# Patient Record
Sex: MALE | Race: WHITE | URBAN - METROPOLITAN AREA
[De-identification: names, ages, dates, MRNs, and addresses within clinical notes are randomized per-mention and may not be internally consistent; named-entity substitution may affect disease eponyms.]

---

## 2017-04-05 ENCOUNTER — HOSPITAL ENCOUNTER (EMERGENCY)
Facility: CLINIC | Age: 32
Discharge: HOME | End: 2017-04-05
Attending: EMERGENCY MEDICINE | Admitting: EMERGENCY MEDICINE
Payer: COMMERCIAL

## 2017-04-05 ENCOUNTER — APPOINTMENT (OUTPATIENT)
Dept: GENERAL RADIOLOGY | Facility: CLINIC | Age: 32
End: 2017-04-05
Attending: EMERGENCY MEDICINE
Payer: COMMERCIAL

## 2017-04-05 VITALS
WEIGHT: 170 LBS | HEART RATE: 40 BPM | DIASTOLIC BLOOD PRESSURE: 77 MMHG | TEMPERATURE: 97.7 F | BODY MASS INDEX: 23.71 KG/M2 | RESPIRATION RATE: 20 BRPM | OXYGEN SATURATION: 99 % | SYSTOLIC BLOOD PRESSURE: 125 MMHG

## 2017-04-05 DIAGNOSIS — R07.89 ATYPICAL CHEST PAIN: ICD-10-CM

## 2017-04-05 DIAGNOSIS — I49.3 FREQUENT PVCS: ICD-10-CM

## 2017-04-05 LAB
ANION GAP SERPL CALCULATED.3IONS-SCNC: 7 MMOL/L (ref 3–14)
BASOPHILS # BLD AUTO: 0 10E9/L (ref 0–0.2)
BASOPHILS NFR BLD AUTO: 0.4 %
BUN SERPL-MCNC: 12 MG/DL (ref 7–30)
CALCIUM SERPL-MCNC: 8.7 MG/DL (ref 8.5–10.1)
CHLORIDE SERPL-SCNC: 104 MMOL/L (ref 94–109)
CO2 SERPL-SCNC: 27 MMOL/L (ref 20–32)
CREAT SERPL-MCNC: 0.85 MG/DL (ref 0.66–1.25)
DIFFERENTIAL METHOD BLD: NORMAL
EOSINOPHIL # BLD AUTO: 0.3 10E9/L (ref 0–0.7)
EOSINOPHIL NFR BLD AUTO: 4.4 %
ERYTHROCYTE [DISTWIDTH] IN BLOOD BY AUTOMATED COUNT: 12.6 % (ref 10–15)
GFR SERPL CREATININE-BSD FRML MDRD: NORMAL ML/MIN/1.7M2
GLUCOSE SERPL-MCNC: 89 MG/DL (ref 70–99)
HCT VFR BLD AUTO: 42.8 % (ref 40–53)
HGB BLD-MCNC: 14.1 G/DL (ref 13.3–17.7)
IMM GRANULOCYTES # BLD: 0 10E9/L (ref 0–0.4)
IMM GRANULOCYTES NFR BLD: 0.3 %
LYMPHOCYTES # BLD AUTO: 2.1 10E9/L (ref 0.8–5.3)
LYMPHOCYTES NFR BLD AUTO: 27.4 %
MAGNESIUM SERPL-MCNC: 2.1 MG/DL (ref 1.6–2.3)
MCH RBC QN AUTO: 27.9 PG (ref 26.5–33)
MCHC RBC AUTO-ENTMCNC: 32.9 G/DL (ref 31.5–36.5)
MCV RBC AUTO: 85 FL (ref 78–100)
MONOCYTES # BLD AUTO: 0.7 10E9/L (ref 0–1.3)
MONOCYTES NFR BLD AUTO: 9 %
NEUTROPHILS # BLD AUTO: 4.5 10E9/L (ref 1.6–8.3)
NEUTROPHILS NFR BLD AUTO: 58.5 %
NRBC # BLD AUTO: 0 10*3/UL
NRBC BLD AUTO-RTO: 0 /100
PLATELET # BLD AUTO: 250 10E9/L (ref 150–450)
POTASSIUM SERPL-SCNC: 3.5 MMOL/L (ref 3.4–5.3)
RBC # BLD AUTO: 5.05 10E12/L (ref 4.4–5.9)
SODIUM SERPL-SCNC: 138 MMOL/L (ref 133–144)
TSH SERPL DL<=0.05 MIU/L-ACNC: 1.5 MU/L (ref 0.4–4)
WBC # BLD AUTO: 7.8 10E9/L (ref 4–11)

## 2017-04-05 PROCEDURE — 80048 BASIC METABOLIC PNL TOTAL CA: CPT | Performed by: EMERGENCY MEDICINE

## 2017-04-05 PROCEDURE — 71020 XR CHEST 2 VW: CPT

## 2017-04-05 PROCEDURE — 93005 ELECTROCARDIOGRAM TRACING: CPT

## 2017-04-05 PROCEDURE — 83735 ASSAY OF MAGNESIUM: CPT | Performed by: EMERGENCY MEDICINE

## 2017-04-05 PROCEDURE — 84443 ASSAY THYROID STIM HORMONE: CPT | Performed by: EMERGENCY MEDICINE

## 2017-04-05 PROCEDURE — 99285 EMERGENCY DEPT VISIT HI MDM: CPT | Mod: 25

## 2017-04-05 PROCEDURE — 85025 COMPLETE CBC W/AUTO DIFF WBC: CPT | Performed by: EMERGENCY MEDICINE

## 2017-04-05 PROCEDURE — 96361 HYDRATE IV INFUSION ADD-ON: CPT

## 2017-04-05 PROCEDURE — 25000128 H RX IP 250 OP 636: Performed by: EMERGENCY MEDICINE

## 2017-04-05 PROCEDURE — 93005 ELECTROCARDIOGRAM TRACING: CPT | Mod: 76

## 2017-04-05 PROCEDURE — 96360 HYDRATION IV INFUSION INIT: CPT

## 2017-04-05 RX ORDER — LIDOCAINE 40 MG/G
CREAM TOPICAL
Status: DISCONTINUED | OUTPATIENT
Start: 2017-04-05 | End: 2017-04-05 | Stop reason: HOSPADM

## 2017-04-05 RX ORDER — SODIUM CHLORIDE 9 MG/ML
1000 INJECTION, SOLUTION INTRAVENOUS CONTINUOUS
Status: DISCONTINUED | OUTPATIENT
Start: 2017-04-05 | End: 2017-04-05 | Stop reason: HOSPADM

## 2017-04-05 RX ADMIN — SODIUM CHLORIDE 1000 ML: 9 INJECTION, SOLUTION INTRAVENOUS at 15:36

## 2017-04-05 ASSESSMENT — ENCOUNTER SYMPTOMS
SHORTNESS OF BREATH: 0
NAUSEA: 0
ABDOMINAL PAIN: 0
DIAPHORESIS: 0
PALPITATIONS: 0

## 2017-04-05 NOTE — DISCHARGE INSTRUCTIONS
*CHEST PAIN, UNCERTAIN CAUSE    Based on your exam today, the exact cause of your chest pain is not certain. Your condition does not seem serious at this time, and your pain does not appear to be coming from your heart. However, sometimes the signs of a serious problem take more time to appear. Therefore, watch for the warning signs listed below.  HOME CARE:  1. Rest today and avoid strenuous activity.  2. Take any prescribed medicine as directed.  FOLLOW UP with your doctor in 1-3 days.   GET PROMPT MEDICAL ATTENTION if any of the following occur:    A change in the type of pain: if it feels different, becomes more severe, lasts longer, or begins to spread into your shoulder, arm, neck, jaw or back    Shortness of breath or increased pain with breathing    Weakness, dizziness, or fainting    Cough with blood or dark colored sputum (phlegm)    Fever over 101  F (38.3  C)    Swelling, pain or redness in one leg    2526-3738 [place] [address]. All rights reserved. This information is not intended as a substitute for professional medical care. Always follow your healthcare professional's instructions.      Premature Ventricular Contractions  Premature ventricular contractions (PVCs) are a type of arrhythmia (irregular heartbeat). They are common and can affect people of all ages. PVCs are almost never dangerous. But if other heart problems are present, PVCs can cause serious health issues. This sheet tells you more about PVCs and how they are treated.  Understanding Your Heart s Electrical System     During a normal heartbeat, electrical signals start at the SA node and are sent through the walls of the heart s chambers.   To understand how PVCs occur, it helps to first understand how your heart works. Your heart is a muscle that pumps blood throughout the body. It is made up of 4 chambers: 2 atria and 2 ventricles. Electrical signals are sent to these chambers, making them  contract (squeeze) in a certain order. This rhythm, which pumps blood through and out of your heart, is your heartbeat. The process begins in the sinoatrial (SA) or sinus node. This is the heart's natural pacemaker:    The SA node. This group of cells in the right atrium sends a signal to both atria, telling them to contract. When the atria contract, blood is pumped into the ventricles.     The AV node. This is another group of cells in the right atrium. It receives the signal from the SA node after it passes through the atria. The AV node transmits the electrical signal from the atria to the ventricles.   What Happens During a PVC?  During a PVC, an abnormal signal disrupts the normal heartbeat. This signal comes from the ventricle instead of the SA node. The signal causes the ventricles to contract too soon, and the heart skips the next normal beat. This results in an irregular heartbeat.  What Are the Symptoms of PVCs?  Sometimes PVCs cause no symptoms at all. Other times, a patient may feel palpitations (irregular heartbeats). These can feel like  skipped  beats, or  flopping  in the chest. If PVCs are frequent, other symptoms can occur. These include tiredness, feeling faint, or shortness of breath. They also include fullness or pressure in the neck, and chest pain. These symptoms occur because less oxygen is delivered to the body. This is because PVCs make the heart pump blood less effectively.  What Causes PVCs?  In some cases, no cause of PVCs is found. When a cause is found, it is either chemical or structural:    Chemical. Changes in the body s chemistry can prompt PVCs. For instance, raised levels of certain hormones, such as adrenaline or thyroid, can cause PVCs. Consuming substances such as alcohol and caffeine can also cause them.    Structural. This involves existing problems in the heart and/or cardiovascular system. Coronary artery disease (CAD) is 1 type of problem that can be related to PVCs.  Others are heart failure and heart valve problems.   How Are PVCs Diagnosed?  The doctor will take your medical history and ask you to describe your symptoms. You ll also have a physical exam. And certain tests may be done. These include:    Electrocardiogram (ECG or EKG). This test records the electrical activity of your heart. During an ECG, small pads (electrodes) are placed on your chest, arms, and legs. Wires connect the pads to a machine, which records your heart s electrical signals.    Heart monitor. There are 2 types of external heart monitors:    Holter monitor. This monitor can be worn for 1 to 7 days. It provides a constant recording of heart activity. After the test is done, your health care provider analyzes the recording.    Event monitors. These monitors can be used for 3 to 4 weeks. One kind is a memory loop recorder. This monitor records constantly, but stores the recording only when you press a button. The other kind is a credit card-sized recorder. This monitor is turned on only during an episode. With both types, you send the recordings of symptoms to your health care provider over the phone.  How Are PVCs Treated?  Treatment depends on whether structural heart problems are present. It is also determined by the severity of symptoms:    If you have no other heart problems and your symptoms are not bothersome, treatment may not be needed. If it is needed, treatment can involve:    Lifestyle changes. Your doctor may suggest that you exercise and limit caffeine and alcohol. If you smoke, you ll be advised to quit.    Medications. Two types of medication can help with PVCs. Beta-blockers and calcium channel blockers both can lower the heart rate and reduce blood pressure.    If you have structural heart problems, you ll likely be referred to a doctor who specializes in heart rhythm problems. This may be a cardiologist or an electrophysiologist. An electrophysiologist is a cardiologist who specializes  in the electrical system of the heart. You will need a referral because for you, PVCs can be a bigger threat to your health. Depending on the nature of your heart disease, you may need treatment for an underlying heart problem. In severe cases, an ICD (implantable cardioverter defibrillator) may be implanted. This is done to treat the underlying heart disease. It can help normalize the heart rhythm.    1229-0438 [employer]. [address] [place] [address], [place] [address]. All rights reserved. This information is not intended as a substitute for professional medical care. Always follow your healthcare professional's instructions.

## 2017-04-05 NOTE — ED AVS SNAPSHOT
[facility] Department    [address]    [facility] [address]    Phone:  [phone]    Fax:  [phone]                                       Yan Edwards   MRN: [id number]    Department:  [facility] Emergency Department   Date of Visit:  4/5/2017           Patient Information     Date Of Birth          1985        Your diagnoses for this visit were:     Atypical chest pain     Frequent PVCs        You were seen by Sophia Ramires MD.      Follow-up Information     Follow up with Primary clinic.    Why:  within 3 days for reassessment        Follow up with Cardiologist/electrophysiologist.    Why:  1-2 weeks        Follow up with [facility] Emergency Department.    Specialty:  EMERGENCY MEDICINE    Why:  As needed, If symptoms worsen    Contact information:    [address]  [facility] [address]  [phone]        Discharge Instructions          *CHEST PAIN, UNCERTAIN CAUSE    Based on your exam today, the exact cause of your chest pain is not certain. Your condition does not seem serious at this time, and your pain does not appear to be coming from your heart. However, sometimes the signs of a serious problem take more time to appear. Therefore, watch for the warning signs listed below.  HOME CARE:  1. Rest today and avoid strenuous activity.  2. Take any prescribed medicine as directed.  FOLLOW UP with your doctor in 1-3 days.   GET PROMPT MEDICAL ATTENTION if any of the following occur:    A change in the type of pain: if it feels different, becomes more severe, lasts longer, or begins to spread into your shoulder, arm, neck, jaw or back    Shortness of breath or increased pain with breathing    Weakness, dizziness, or fainting    Cough with blood or dark colored sputum (phlegm)    Fever over 101  F (38.3  C)    Swelling, pain or redness in one leg    9679-8172 [address]  [place] [address]. All rights reserved. This information is not intended as a substitute for professional medical care. Always follow your healthcare professional's instructions.      Premature Ventricular Contractions  Premature ventricular contractions (PVCs) are a type of arrhythmia (irregular heartbeat). They are common and can affect people of all ages. PVCs are almost never dangerous. But if other heart problems are present, PVCs can cause serious health issues. This sheet tells you more about PVCs and how they are treated.  Understanding Your Heart s Electrical System     During a normal heartbeat, electrical signals start at the SA node and are sent through the walls of the heart s chambers.   To understand how PVCs occur, it helps to first understand how your heart works. Your heart is a muscle that pumps blood throughout the body. It is made up of 4 chambers: 2 atria and 2 ventricles. Electrical signals are sent to these chambers, making them contract (squeeze) in a certain order. This rhythm, which pumps blood through and out of your heart, is your heartbeat. The process begins in the sinoatrial (SA) or sinus node. This is the heart's natural pacemaker:    The SA node. This group of cells in the right atrium sends a signal to both atria, telling them to contract. When the atria contract, blood is pumped into the ventricles.     The AV node. This is another group of cells in the right atrium. It receives the signal from the SA node after it passes through the atria. The AV node transmits the electrical signal from the atria to the ventricles.   What Happens During a PVC?  During a PVC, an abnormal signal disrupts the normal heartbeat. This signal comes from the ventricle instead of the SA node. The signal causes the ventricles to contract too soon, and the heart skips the next normal beat. This results in an irregular heartbeat.  What Are the Symptoms of PVCs?  Sometimes PVCs cause no symptoms at all. Other times,  a patient may feel palpitations (irregular heartbeats). These can feel like  skipped  beats, or  flopping  in the chest. If PVCs are frequent, other symptoms can occur. These include tiredness, feeling faint, or shortness of breath. They also include fullness or pressure in the neck, and chest pain. These symptoms occur because less oxygen is delivered to the body. This is because PVCs make the heart pump blood less effectively.  What Causes PVCs?  In some cases, no cause of PVCs is found. When a cause is found, it is either chemical or structural:    Chemical. Changes in the body s chemistry can prompt PVCs. For instance, raised levels of certain hormones, such as adrenaline or thyroid, can cause PVCs. Consuming substances such as alcohol and caffeine can also cause them.    Structural. This involves existing problems in the heart and/or cardiovascular system. Coronary artery disease (CAD) is 1 type of problem that can be related to PVCs. Others are heart failure and heart valve problems.   How Are PVCs Diagnosed?  The doctor will take your medical history and ask you to describe your symptoms. You ll also have a physical exam. And certain tests may be done. These include:    Electrocardiogram (ECG or EKG). This test records the electrical activity of your heart. During an ECG, small pads (electrodes) are placed on your chest, arms, and legs. Wires connect the pads to a machine, which records your heart s electrical signals.    Heart monitor. There are 2 types of external heart monitors:    Holter monitor. This monitor can be worn for 1 to 7 days. It provides a constant recording of heart activity. After the test is done, your health care provider analyzes the recording.    Event monitors. These monitors can be used for 3 to 4 weeks. One kind is a memory loop recorder. This monitor records constantly, but stores the recording only when you press a button. The other kind is a credit card-sized recorder. This monitor  is turned on only during an episode. With both types, you send the recordings of symptoms to your health care provider over the phone.  How Are PVCs Treated?  Treatment depends on whether structural heart problems are present. It is also determined by the severity of symptoms:    If you have no other heart problems and your symptoms are not bothersome, treatment may not be needed. If it is needed, treatment can involve:    Lifestyle changes. Your doctor may suggest that you exercise and limit caffeine and alcohol. If you smoke, you ll be advised to quit.    Medications. Two types of medication can help with PVCs. Beta-blockers and calcium channel blockers both can lower the heart rate and reduce blood pressure.    If you have structural heart problems, you ll likely be referred to a doctor who specializes in heart rhythm problems. This may be a cardiologist or an electrophysiologist. An electrophysiologist is a cardiologist who specializes in the electrical system of the heart. You will need a referral because for you, PVCs can be a bigger threat to your health. Depending on the nature of your heart disease, you may need treatment for an underlying heart problem. In severe cases, an ICD (implantable cardioverter defibrillator) may be implanted. This is done to treat the underlying heart disease. It can help normalize the heart rhythm.    2992-1537 [employer]. [address] [place] [address]. All rights reserved. This information is not intended as a substitute for professional medical care. Always follow your healthcare professional's instructions.          24 Hour Appointment Hotline       To make an appointment at any [facility], call [phone] ([phone]). If you don't have a family doctor or clinic, we will help you find one. [facility] are conveniently located to serve the needs of you and your family.             Review of your medicines      Our records show that  you are taking the medicines listed below. If these are incorrect, please call your family doctor or clinic.        Dose / Directions Last dose taken    sildenafil 100 MG cap/tab   Commonly known as:  VIAGRA   Dose:   mg   Quantity:  2 tablet        Take 0.5-1 tablets ( mg) by mouth daily as needed for erectile dysfunction Take 30 min to 4 hours before intercourse.  Never use with nitroglycerin, terazosin or doxazosin.   Refills:  0                Procedures and tests performed during your visit     Basic metabolic panel    CBC with platelets differential    EKG 12 lead    EKG 12-lead, tracing only    Magnesium    TSH    XR Chest 2 Views      Orders Needing Specimen Collection     None      Pending Results     Date and Time Order Name Status Description    4/5/2017 1524 EKG 12-lead, tracing only Preliminary             Pending Culture Results     No orders found from 4/3/2017 to 4/6/2017.            Test Results From Your Hospital Stay        4/5/2017  3:42 PM      Component Results     Component Value Ref Range & Units Status    WBC 7.8 4.0 - 11.0 10e9/L Final    RBC Count 5.05 4.4 - 5.9 10e12/L Final    Hemoglobin 14.1 13.3 - 17.7 g/dL Final    Hematocrit 42.8 40.0 - 53.0 % Final    MCV 85 78 - 100 fl Final    MCH 27.9 26.5 - 33.0 pg Final    MCHC 32.9 31.5 - 36.5 g/dL Final    RDW 12.6 10.0 - 15.0 % Final    Platelet Count 250 150 - 450 10e9/L Final    Diff Method Automated Method  Final    % Neutrophils 58.5 % Final    % Lymphocytes 27.4 % Final    % Monocytes 9.0 % Final    % Eosinophils 4.4 % Final    % Basophils 0.4 % Final    % Immature Granulocytes 0.3 % Final    Nucleated RBCs 0 0 /100 Final    Absolute Neutrophil 4.5 1.6 - 8.3 10e9/L Final    Absolute Lymphocytes 2.1 0.8 - 5.3 10e9/L Final    Absolute Monocytes 0.7 0.0 - 1.3 10e9/L Final    Absolute Eosinophils 0.3 0.0 - 0.7 10e9/L Final    Absolute Basophils 0.0 0.0 - 0.2 10e9/L Final    Abs Immature Granulocytes 0.0 0 - 0.4 10e9/L Final     Absolute Nucleated RBC 0.0  Final         4/5/2017  4:04 PM      Component Results     Component Value Ref Range & Units Status    Sodium 138 133 - 144 mmol/L Final    Potassium 3.5 3.4 - 5.3 mmol/L Final    Chloride 104 94 - 109 mmol/L Final    Carbon Dioxide 27 20 - 32 mmol/L Final    Anion Gap 7 3 - 14 mmol/L Final    Glucose 89 70 - 99 mg/dL Final    Urea Nitrogen 12 7 - 30 mg/dL Final    Creatinine 0.85 0.66 - 1.25 mg/dL Final    GFR Estimate >90  Non  GFR Calc   >60 mL/min/1.7m2 Final    GFR Estimate If Black >90   GFR Calc   >60 mL/min/1.7m2 Final    Calcium 8.7 8.5 - 10.1 mg/dL Final         4/5/2017  4:04 PM      Component Results     Component Value Ref Range & Units Status    Magnesium 2.1 1.6 - 2.3 mg/dL Final         4/5/2017  4:25 PM      Narrative     XR CHEST 2 VW   4/5/2017 4:10 PM     HISTORY: Chest pain    COMPARISON: None.        Impression     IMPRESSION: Calcified granulomas in the right lower lobe. No definite  acute pneumonia.    BAMBI ALMENDAREZ MD         4/5/2017  4:09 PM      Component Results     Component Value Ref Range & Units Status    TSH 1.50 0.40 - 4.00 mU/L Final                Clinical Quality Measure: Blood Pressure Screening     Your blood pressure was checked while you were in the emergency department today. The last reading we obtained was  BP: 125/77 . Please read the guidelines below about what these numbers mean and what you should do about them.  If your systolic blood pressure (the top number) is less than 120 and your diastolic blood pressure (the bottom number) is less than 80, then your blood pressure is normal. There is nothing more that you need to do about it.  If your systolic blood pressure (the top number) is 120-139 or your diastolic blood pressure (the bottom number) is 80-89, your blood pressure may be higher than it should be. You should have your blood pressure rechecked within a year by a primary care provider.  If your  "systolic blood pressure (the top number) is 140 or greater or your diastolic blood pressure (the bottom number) is 90 or greater, you may have high blood pressure. High blood pressure is treatable, but if left untreated over time it can put you at risk for heart attack, stroke, or kidney failure. You should have your blood pressure rechecked by a primary care provider within the next 4 weeks.  If your provider in the emergency department today gave you specific instructions to follow-up with your doctor or provider even sooner than that, you should follow that instruction and not wait for up to 4 weeks for your follow-up visit.        Thank you for choosing [facility]       Thank you for choosing [facility] for your care. Our goal is always to provide you with excellent care. Hearing back from our patients is one way we can continue to improve our services. Please take a few minutes to complete the written survey that you may receive in the mail after you visit with us. Thank you!        [employer] Information     [employer] lets you send messages to your doctor, view your test results, renew your prescriptions, schedule appointments and more. To sign up, go to [email or web address] . Click on \"Log in\" on the left side of the screen, which will take you to the Welcome page. Then click on \"Sign up Now\" on the right side of the page.     You will be asked to enter the access code listed below, as well as some personal information. Please follow the directions to create your username and password.     Your access code is: [email or web address]  Expires: 2017  5:06 PM     Your access code will  in 90 days. If you need help or a new code, please call your [facility] clinic or [phone].        Care EveryWhere ID     This is your Care EveryWhere ID. This could be used by other organizations to access your [facility] medical records  [id number]        After Visit Summary       This is your record. Keep this with you and show to " your community pharmacist(s) and doctor(s) at your next visit.

## 2017-04-05 NOTE — ED PROVIDER NOTES
History     Chief Complaint:  Chest Pain    HPI:    Yan Edwards is a 31 year old, otherwise healthy male who presents with chest pain. The patient reports that he began experiencing chest pain starting last night around 1800, worsening with deep breathing. Although these pains were absent when he woke up this morning, he had one episode lasting a second of sharp pain when he inhaled. He describes the pain as sharp, lasting a second only with the breath. Of note, he did experience similar pain about a month ago, however, it never lasted this long. He denies shortness of breath, palpitations, leg swelling, nausea, abdominal pain, diaphoresis, or excessive caffeine, drug, or alcohol use. He denies recent infection or cold. He denies cough/hemoptysis.     CARDIAC RISK FACTORS:  Sex:    Male  Tobacco:   Former Smoker  Hypertension:   No  Hyperlipidemia:  No  Diabetes:   No  Family History:  No    PE/DVT RISK FACTORS:  Sex:    Male  Hormones:   No  Tobacco:   Former Smoker  Cancer:   No  Travel:   No  Surgery:   No  Other immobilization: No  Personal history:  No  Family history:  No     Allergies:  No known drug allergies      Medications:    Viagra     Past Medical History:    The patient does not have any past pertinent medical history.     Past Surgical History:    Tonsillectomy    Family History:    Migraines- Mother, Father  Osteoarthritis- Mother  Cancer- Brother  Tourette's Syndrome- Brother    Social History:  Smoking status: Former Smoker -- 0.5 ppd for 2 years   Alcohol use: 5-8 drinks/week   Marital Status:  Single      Review of Systems   Constitutional: Negative for diaphoresis.   Respiratory: Negative for shortness of breath.    Cardiovascular: Positive for chest pain. Negative for palpitations and leg swelling.   Gastrointestinal: Negative for abdominal pain and nausea.   All other systems reviewed and are negative.      Physical Exam     Patient Vitals for the past 24 hrs:   BP Temp Pulse Heart  Rate Resp SpO2 Weight   04/05/17 1700 125/77 - - 77 - 99 % -   04/05/17 1645 127/79 - - 71 - 99 % -   04/05/17 1600 120/81 - - 75 - 99 % -   04/05/17 1545 (!) 135/94 - - 64 - 100 % -   04/05/17 1505 128/70 97.7  F (36.5  C) (!) 40 - 20 100 % 77.1 kg (170 lb)      Physical Exam:  General: Adult male sitting upright.  Eyes: PERRL, Conjunctive within normal limits  ENT: Moist mucous membranes, oropharynx clear.   Neck: No thyroid megaly.  CV: Normal S1S2, no murmur, rub or gallop. Regular rate and rhythm aside from runs with frequent but isolated premature beats.   Resp: Clear to auscultation bilaterally, no wheezes, rales or rhonchi. Normal respiratory effort.  GI: Abdomen is soft, nontender and nondistended. No palpable masses. No rebound or guarding.  MSK: No edema. Nontender. Normal active range of motion. No calf asymmetry or tenderness to palpation.  Skin: Warm and dry. No rashes or lesions or ecchymoses on visible skin.  Neuro: Alert and oriented. Responds appropriately to all questions and commands. No focal findings appreciated. Normal muscle tone.  Psych: Normal mood and affect. Pleasant.     Emergency Department Course     ECG (15:00:52):  Rate 76 bpm. AZ interval 146. QRS duration 114. QT/QTc 410/461. P-R-T axes 72-29-77. Sinus rhythm with frequent premature ventricular complexes in a pattern of bigeminy. Otherwise normal ECG. Interpreted at 1523 by Sophia Ramires MD.    ECG (15:30:37):  Rate 60 bpm. AZ interval 156. QRS duration 118. QT/QTc 406/406. P-R-T axes 67-28-67. Sinus rhythm with marked sinus arrhythmia. Nonspecific intraventricular conduction delay. Borderline ECG. Interpreted at 1534 by Sophia Ramires MD.     Imaging:  Radiographic findings were communicated with the patient who voiced understanding of the findings.    X-ray Chest, 2 views:  IMPRESSION: Calcified granulomas in the right lower lobe. No definite  acute pneumonia.  Result per radiology.     Laboratory:  TSH:  1.50  Magnesium: 2.1  Basal Metabolic Panel: WNL (Creatinine 0.85)  CBC: WNL (WBC 7.8, HGB 14.1, )       Interventions:  1536- NS 1L IV Bolus     Emergency Department Course:  An ECG was obtained.    Past medical records, nursing notes, and vitals reviewed.  1517: I performed an exam of the patient and obtained history, as documented above.    IV inserted and blood drawn.     A repeat ECG was obtained.    The above intervention was administered.    The patient was sent for a chest X-Ray while in the emergency department, findings above.     1700: I rechecked the patient. X-Ray, ECG, and laboratory findings and plan explained to the Patient. Patient discharged home with instructions regarding supportive care, medications, and reasons to return. The importance of close follow-up was reviewed.       Impression & Plan      Medical Decision Making:    Yan Edwards is a 31 year old male, healthy at baseline, who presents to the emergency department with concerns for sharp, brief chest pains, initially starting last night, but occurring again this afternoon, most consistent with pleuritic pain, unclear cause. He has no associated symptoms. Here, he was noted to have frequent PVC's, none of which were sustained or sequential. He would have long durations of normal sinus rhythms followed by minute long durations of PVC's every other beat. He had no symptoms associated with these. He did not have chest pain here in the ED and I did not suspect that this was cardiac in origin. He had no abnormalities on his X-Ray, ECG, or laboratory workup that included magnesium and electrolytes. As his PVC's are asymptomatic, I do not think these are worrisome from an emergency room standpoint. I recommended that he establish care with a primary care provider within the next two days. I also recommended cardiology/electrophysiology follow up within 1-2 weeks. He verbalized understanding with these plans and will be discharged home  is stable condition. He should return to the emergency department for syncope, shortness of breath, dizziness, increasing chest pain, or any other concerning symptoms. All questions were answered prior to discharge.     Diagnosis:    ICD-10-CM   1. Atypical chest pain R07.89   2. Frequent PVCs I49.3     Disposition:  Discharged to home.    Erica George  4/5/2017   [facility] EMERGENCY DEPARTMENT    I, Erica George, am serving as a scribe at 3:17 PM on 4/5/2017 to document services personally performed by Sophia Ramires MD based on my observations and the provider's statements to me.       Sophia Ramires MD  04/06/17 [id number]

## 2017-04-05 NOTE — ED AVS SNAPSHOT
[facility] Emergency Department    [address]    [facility] [address]    Phone:  [phone]    Fax:  [phone]                                       Yan Edwards   MRN: [id number]    Department:  [facility] Emergency Department   Date of Visit:  4/5/2017           After Visit Summary Signature Page     I have received my discharge instructions, and my questions have been answered. I have discussed any challenges I see with this plan with the nurse or doctor.    ..........................................................................................................................................  Patient/Patient Representative Signature      ..........................................................................................................................................  Patient Representative Print Name and Relationship to Patient    ..................................................               ................................................  Date                                            Time    ..........................................................................................................................................  Reviewed by Signature/Title    ...................................................              ..............................................  Date                                                            Time

## 2017-04-05 NOTE — ED NOTES
Chest pain started last night at 1800. Pain is worse with deep breath. ABC intact alert and no distress.

## 2017-04-06 LAB
INTERPRETATION ECG - MUSE: NORMAL
INTERPRETATION ECG - MUSE: NORMAL

## 2017-04-19 ENCOUNTER — DOCUMENTATION ONLY (OUTPATIENT)
Dept: CARDIOLOGY | Facility: CLINIC | Age: 32
End: 2017-04-19

## 2017-04-25 ENCOUNTER — OFFICE VISIT (OUTPATIENT)
Dept: CARDIOLOGY | Facility: CLINIC | Age: 32
End: 2017-04-25
Payer: COMMERCIAL

## 2017-04-25 VITALS
HEIGHT: 71 IN | BODY MASS INDEX: 24.75 KG/M2 | HEART RATE: 50 BPM | WEIGHT: 176.8 LBS | SYSTOLIC BLOOD PRESSURE: 120 MMHG | DIASTOLIC BLOOD PRESSURE: 70 MMHG

## 2017-04-25 DIAGNOSIS — R06.00 DYSPNEA, UNSPECIFIED TYPE: ICD-10-CM

## 2017-04-25 DIAGNOSIS — R07.9 ACUTE CHEST PAIN: Primary | ICD-10-CM

## 2017-04-25 PROCEDURE — 99203 OFFICE O/P NEW LOW 30 MIN: CPT | Performed by: INTERNAL MEDICINE

## 2017-04-25 NOTE — PROGRESS NOTES
"Cardiology Consultation      Yan Edwards MRN# [id number]   YOB: 1985 Age: 31 year old   Date of Visit 04/25/2017     Reason for consult:  chest pain            Assessment and Plan:     1. Atypical chest pain, likely pleuritic or periosteal irritation    Exercise stress echo to rule out structural abnormality with the PVCs and dyspnea.    Asked patient to be cognizant of his positioning in the evenings and watch for associated signs and symptoms, especially if any dyspnea on exertion or feelings of palpitations with his chest pain.    This note was transcribed using electronic voice recognition software, typographical errors may be present.                Chief Complaint:   Chest Pain           History of Present Illness:   This patient is a very pleasant 31 year old male who has had more episodes of atypical chest pain in the last six months.  It often happens in the evenings when he is sitting.  It has a pleuritic component where it is difficult for him to get a deep breath and hurts with a sharp, stabbing discomfort.  It has rapid onset and usually pretty quick offset.  He can be sore in the lower rib area for a few hours afterwards.    He does not think his symptoms are frankly exertional.  He was found to have occasional PVCs that are likely asymptomatic.  He denies any exertional chest discomfort or dyspnea on exertion.  Denies any recent viral illnesses.  No structural heart abnormalities in his family.           Physical Exam:     Vitals: /70  Pulse 50  Ht 1.803 m (5' 11\")  Wt 80.2 kg (176 lb 12.8 oz)  BMI 24.66 kg/m2  Constitutional:  cooperative, alert and oriented, well developed, well nourished, in no acute distress        Skin:  warm and dry to the touch, no apparent skin lesions or masses noted        Head:  normocephalic, no masses or lesions        Eyes:  pupils equal and round, conjunctivae and lids unremarkable, sclera white, no xanthalasma, EOMS intact, no nystagmus    "     ENT:  no pallor or cyanosis, dentition good        Neck:  no carotid bruit;JVP normal        Chest:  normal breath sounds, clear to auscultation, normal A-P diameter, normal symmetry, normal respiratory excursion, no use of accessory muscles        Cardiac: regular rhythm;normal S1 and S2     no presence of murmur            Abdomen:  non-tender        Vascular: pulses full and equal                                      Extremities and Back:  no deformities, clubbing, cyanosis, erythema observed;no edema        Neurological:  affect appropriate, oriented to time, person and place;no gross motor deficits                    Past Medical History:   I have reviewed this patient's past medical history  Past Medical History:   Diagnosis Date     NO ACTIVE PROBLEMS              Past Surgical History:   I have reviewed this patient's past surgical history  Past Surgical History:   Procedure Laterality Date     HC REMOVAL OF TONSILS,<11 Y/O  age 3    Tonsils <10y.o.     HC TOOTH EXTRACTION W/FORCEP  age 14               Social History:   I have reviewed this patient's social history  Social History   Substance Use Topics     Smoking status: Former Smoker     Packs/day: 0.50     Years: 2.00     Smokeless tobacco: Never Used      Comment: cut back to 3-4 /day     Alcohol use 3.0 - 4.8 oz/week     5 - 8 Standard drinks or equivalent per week             Family History:   I have reviewed this patient's family history  Family History   Problem Relation Age of Onset     DIABETES Paternal Grandmother 70     late onset     Alzheimer Disease Paternal Grandmother      demntia     Neurologic Disorder Mother      migraines     Neurologic Disorder Father      migraines     Arthritis Mother      osteoarthritis     CEREBROVASCULAR DISEASE Maternal Grandmother 90          Hypertension Maternal Grandmother      Lipids Maternal Grandmother      Cancer - colorectal Maternal Grandmother      onset 85     CANCER Maternal Grandfather 70      ocular melanoma     Cancer - colorectal Maternal Grandfather 80     CEREBROVASCULAR DISEASE Maternal Grandfather 86          CANCER Brother 33     nonmelanoma skin cancer     Neurologic Disorder Brother      Tourette's             Allergies:   No Known Allergies          Medications:   I have reviewed this patient's current medications  No current outpatient prescriptions on file.               Review of Systems:     Review of Systems:  Skin:  Negative     Eyes:  Positive for glasses  ENT:  Negative    Respiratory:  Negative    Cardiovascular:    Positive for;chest pain (17 pt had chest pain lasting three hours while at rest.17 chest pain reported lasting a hour.)  Gastroenterology: Negative    Genitourinary:  Negative    Musculoskeletal:  Negative    Neurologic:  Negative    Psychiatric:  Negative    Heme/Lymph/Imm:  Negative    Endocrine:  Negative                       Data:   All laboratory data reviewed  Lab Results   Component Value Date    CHOL 141 2015     Lab Results   Component Value Date    HDL 37 2015     Lab Results   Component Value Date    LDL 82 2015     Lab Results   Component Value Date    TRIG 110 2015     Lab Results   Component Value Date    CHOLHDLRATIO 3.8 2015     TSH   Date Value Ref Range Status   2017 1.50 0.40 - 4.00 mU/L Final     Last Basic Metabolic Panel:  Lab Results   Component Value Date     2017      Lab Results   Component Value Date    POTASSIUM 3.5 2017     Lab Results   Component Value Date    CHLORIDE 104 2017     Lab Results   Component Value Date    SARIAH 8.7 2017     Lab Results   Component Value Date    CO2 27 2017     Lab Results   Component Value Date    BUN 12 2017     Lab Results   Component Value Date    CR 0.85 2017     Lab Results   Component Value Date    GLC 89 2017     Lab Results   Component Value Date    WBC 7.8 2017     Lab Results   Component Value  Date    RBC 5.05 04/05/2017     Lab Results   Component Value Date    HGB 14.1 04/05/2017     Lab Results   Component Value Date    HCT 42.8 04/05/2017     Lab Results   Component Value Date    MCV 85 04/05/2017     Lab Results   Component Value Date    MCH 27.9 04/05/2017     Lab Results   Component Value Date    MCHC 32.9 04/05/2017     Lab Results   Component Value Date    RDW 12.6 04/05/2017     Lab Results   Component Value Date     04/05/2017

## 2017-04-25 NOTE — LETTER
"4/25/2017    [facility]  [address] [place] [address]    RE: Yan Edwards       Dear Colleague,    I had the pleasure of seeing Yan Edwards in the [facility].    Cardiology Consultation      Yan Edwards MRN# [id number]   YOB: 1985 Age: 31 year old   Date of Visit 04/25/2017     Reason for consult:  chest pain            Assessment and Plan:     1. Atypical chest pain, likely pleuritic or periosteal irritation    Exercise stress echo to rule out structural abnormality with the PVCs and dyspnea.    Asked patient to be cognizant of his positioning in the evenings and watch for associated signs and symptoms, especially if any dyspnea on exertion or feelings of palpitations with his chest pain.    This note was transcribed using electronic voice recognition software, typographical errors may be present.                Chief Complaint:   Chest Pain           History of Present Illness:   This patient is a very pleasant 31 year old male who has had more episodes of atypical chest pain in the last six months.  It often happens in the evenings when he is sitting.  It has a pleuritic component where it is difficult for him to get a deep breath and hurts with a sharp, stabbing discomfort.  It has rapid onset and usually pretty quick offset.  He can be sore in the lower rib area for a few hours afterwards.    He does not think his symptoms are frankly exertional.  He was found to have occasional PVCs that are likely asymptomatic.  He denies any exertional chest discomfort or dyspnea on exertion.  Denies any recent viral illnesses.  No structural heart abnormalities in his family.           Physical Exam:     Vitals: /70  Pulse 50  Ht 1.803 m (5' 11\")  Wt 80.2 kg (176 lb 12.8 oz)  BMI 24.66 kg/m2  Constitutional:  cooperative, alert and oriented, well developed, well nourished, in no acute distress        Skin:  warm and dry to the " touch, no apparent skin lesions or masses noted        Head:  normocephalic, no masses or lesions        Eyes:  pupils equal and round, conjunctivae and lids unremarkable, sclera white, no xanthalasma, EOMS intact, no nystagmus        ENT:  no pallor or cyanosis, dentition good        Neck:  no carotid bruit;JVP normal        Chest:  normal breath sounds, clear to auscultation, normal A-P diameter, normal symmetry, normal respiratory excursion, no use of accessory muscles        Cardiac: regular rhythm;normal S1 and S2     no presence of murmur            Abdomen:  non-tender        Vascular: pulses full and equal                                      Extremities and Back:  no deformities, clubbing, cyanosis, erythema observed;no edema        Neurological:  affect appropriate, oriented to time, person and place;no gross motor deficits                    Past Medical History:   I have reviewed this patient's past medical history  Past Medical History:   Diagnosis Date     NO ACTIVE PROBLEMS              Past Surgical History:   I have reviewed this patient's past surgical history  Past Surgical History:   Procedure Laterality Date     HC REMOVAL OF TONSILS,<11 Y/O  age 3    Tonsils <10y.o.     HC TOOTH EXTRACTION W/FORCEP  age 14               Social History:   I have reviewed this patient's social history  Social History   Substance Use Topics     Smoking status: Former Smoker     Packs/day: 0.50     Years: 2.00     Smokeless tobacco: Never Used      Comment: cut back to 3-4 /day     Alcohol use 3.0 - 4.8 oz/week     5 - 8 Standard drinks or equivalent per week             Family History:   I have reviewed this patient's family history  Family History   Problem Relation Age of Onset     DIABETES Paternal Grandmother 70     late onset     Alzheimer Disease Paternal Grandmother      demntia     Neurologic Disorder Mother      migraines     Neurologic Disorder Father      migraines     Arthritis Mother       osteoarthritis     CEREBROVASCULAR DISEASE Maternal Grandmother 90          Hypertension Maternal Grandmother      Lipids Maternal Grandmother      Cancer - colorectal Maternal Grandmother      onset 85     CANCER Maternal Grandfather 70     ocular melanoma     Cancer - colorectal Maternal Grandfather 80     CEREBROVASCULAR DISEASE Maternal Grandfather 86          CANCER Brother 33     nonmelanoma skin cancer     Neurologic Disorder Brother      Tourette's             Allergies:   No Known Allergies          Medications:   I have reviewed this patient's current medications  No current outpatient prescriptions on file.               Review of Systems:     Review of Systems:  Skin:  Negative     Eyes:  Positive for glasses  ENT:  Negative    Respiratory:  Negative    Cardiovascular:    Positive for;chest pain (17 pt had chest pain lasting three hours while at rest.17 chest pain reported lasting a hour.)  Gastroenterology: Negative    Genitourinary:  Negative    Musculoskeletal:  Negative    Neurologic:  Negative    Psychiatric:  Negative    Heme/Lymph/Imm:  Negative    Endocrine:  Negative                       Data:   All laboratory data reviewed  Lab Results   Component Value Date    CHOL 141 2015     Lab Results   Component Value Date    HDL 37 2015     Lab Results   Component Value Date    LDL 82 2015     Lab Results   Component Value Date    TRIG 110 2015     Lab Results   Component Value Date    CHOLHDLRATIO 3.8 2015     TSH   Date Value Ref Range Status   2017 1.50 0.40 - 4.00 mU/L Final     Last Basic Metabolic Panel:  Lab Results   Component Value Date     2017      Lab Results   Component Value Date    POTASSIUM 3.5 2017     Lab Results   Component Value Date    CHLORIDE 104 2017     Lab Results   Component Value Date    SARIAH 8.7 2017     Lab Results   Component Value Date    CO2 27 2017     Lab Results   Component Value  Date    BUN 12 04/05/2017     Lab Results   Component Value Date    CR 0.85 04/05/2017     Lab Results   Component Value Date    GLC 89 04/05/2017     Lab Results   Component Value Date    WBC 7.8 04/05/2017     Lab Results   Component Value Date    RBC 5.05 04/05/2017     Lab Results   Component Value Date    HGB 14.1 04/05/2017     Lab Results   Component Value Date    HCT 42.8 04/05/2017     Lab Results   Component Value Date    MCV 85 04/05/2017     Lab Results   Component Value Date    MCH 27.9 04/05/2017     Lab Results   Component Value Date    MCHC 32.9 04/05/2017     Lab Results   Component Value Date    RDW 12.6 04/05/2017     Lab Results   Component Value Date     04/05/2017     Thank you for allowing me to participate in the care of your patient.    Sincerely,     Khanh Obando MD     [facility]

## 2017-04-25 NOTE — MR AVS SNAPSHOT
"              After Visit Summary   4/25/2017    Yan Edwards    MRN: [id number]           Patient Information     Date Of Birth          1985        Visit Information        Provider Department      4/25/2017 8:30 AM Khanh Obando MD [facility]        Today's Diagnoses     Acute chest pain    -  1    Dyspnea, unspecified type           Follow-ups after your visit        Future tests that were ordered for you today     Open Future Orders        Priority Expected Expires Ordered    Exercise Stress Echocardiogram Routine 5/2/2017 4/25/2018 4/25/2017            Who to contact     If you have questions or need follow up information about today's clinic visit or your schedule please contact [facility] directly at [phone].  Normal or non-critical lab and imaging results will be communicated to you by MyChart, letter or phone within 4 business days after the clinic has received the results. If you do not hear from us within 7 days, please contact the clinic through [facility] or phone. If you have a critical or abnormal lab result, we will notify you by phone as soon as possible.  Submit refill requests through [employer] or call your pharmacy and they will forward the refill request to us. Please allow 3 business days for your refill to be completed.          Additional Information About Your Visit        Ti Knighthar[facility]luotianxia Information     [employer]a lets you send messages to your doctor, view your test results, renew your prescriptions, schedule appointments and more. To sign up, go to [email or web address] . Click on \"Log in\" on the left side of the screen, which will take you to the Welcome page. Then click on \"Sign up Now\" on the right side of the page.     You will be asked to enter the access code listed below, as well as some personal information. Please follow the directions to create your username and password.     Your access " "code is: [email or web address]  Expires: 2017  5:06 PM     Your access code will  in 90 days. If you need help or a new code, please call your [facility] clinic or [phone].        Care EveryWhere ID     This is your Care EveryWhere ID. This could be used by other organizations to access your [facility] medical records  [id number]        Your Vitals Were     Pulse Height BMI (Body Mass Index)             50 1.803 m (5' 11\") 24.66 kg/m2          Blood Pressure from Last 3 Encounters:   17 120/70   17 125/77   16 138/78    Weight from Last 3 Encounters:   17 80.2 kg (176 lb 12.8 oz)   17 77.1 kg (170 lb)   16 79.8 kg (176 lb)                 Today's Medication Changes          These changes are accurate as of: 17  9:08 AM.  If you have any questions, ask your nurse or doctor.               Stop taking these medicines if you haven't already. Please contact your care team if you have questions.     sildenafil 100 MG cap/tab   Commonly known as:  VIAGRA   Stopped by:  Khanh Obando MD                    Primary Care Provider Office Phone # Fax     [facility] [phone] [address] [place] [address]        Thank you!     Thank you for choosing [facility] [place]  for your care. Our goal is always to provide you with excellent care. Hearing back from our patients is one way we can continue to improve our services. Please take a few minutes to complete the written survey that you may receive in the mail after your visit with us. Thank you!             Your Updated Medication List - Protect others around you: Learn how to safely use, store and throw away your medicines at www.disposemymeds.org.      Notice  As of 2017  9:08 AM    You have not been prescribed any medications.      "

## 2017-05-15 ENCOUNTER — OFFICE VISIT (OUTPATIENT)
Dept: FAMILY MEDICINE | Facility: CLINIC | Age: 32
End: 2017-05-15

## 2017-05-15 VITALS
DIASTOLIC BLOOD PRESSURE: 70 MMHG | OXYGEN SATURATION: 98 % | BODY MASS INDEX: 24.94 KG/M2 | SYSTOLIC BLOOD PRESSURE: 122 MMHG | TEMPERATURE: 98 F | HEART RATE: 58 BPM | WEIGHT: 174.2 LBS | HEIGHT: 70 IN

## 2017-05-15 DIAGNOSIS — Z80.8 FH: MELANOMA: ICD-10-CM

## 2017-05-15 DIAGNOSIS — Z71.89 ACP (ADVANCE CARE PLANNING): ICD-10-CM

## 2017-05-15 DIAGNOSIS — Z80.0 FH: COLON CANCER: ICD-10-CM

## 2017-05-15 DIAGNOSIS — L30.9 ECZEMA, UNSPECIFIED TYPE: ICD-10-CM

## 2017-05-15 DIAGNOSIS — R07.89 ATYPICAL CHEST PAIN: ICD-10-CM

## 2017-05-15 DIAGNOSIS — Z00.00 ROUTINE GENERAL MEDICAL EXAMINATION AT A HEALTH CARE FACILITY: Primary | ICD-10-CM

## 2017-05-15 PROCEDURE — 80053 COMPREHEN METABOLIC PANEL: CPT | Mod: 90 | Performed by: FAMILY MEDICINE

## 2017-05-15 PROCEDURE — 99395 PREV VISIT EST AGE 18-39: CPT | Performed by: FAMILY MEDICINE

## 2017-05-15 PROCEDURE — 80061 LIPID PANEL: CPT | Mod: 90 | Performed by: FAMILY MEDICINE

## 2017-05-15 PROCEDURE — 36415 COLL VENOUS BLD VENIPUNCTURE: CPT | Performed by: FAMILY MEDICINE

## 2017-05-15 RX ORDER — TRIAMCINOLONE ACETONIDE 1 MG/G
CREAM TOPICAL
Refills: 2 | COMMUNITY
Start: 2017-01-31

## 2017-05-15 NOTE — NURSING NOTE
"Yan Edwards is here for a CPX. Fasting: Yes.    Pre-visit Planning  Immunizations -up to date  Colonoscopy -NA  Mammogram -NA  Asthma test --NA  PHQ2 -is completed today  CHERYL 7 -NA    Vitals:  BP Cuff right  Arm with large Cuff  PULSE regular  174 lbs 3.2 oz and 5' 10\"  CLASSIFICATION OF OVERWEIGHT AND OBESITY BY BMI                        Obesity Class           BMI(kg/m2)  Underweight                                    < 18.5  Normal                                         18.5-24.9  Overweight                                     25.0-29.9  OBESITY                     I                  30.0-34.9                             II                 35.0-39.9  EXTREME OBESITY             III                >40      Patient's  BMI Body mass index is 25 kg/(m^2).  Http://hin.nhlbi.nih.gov/menuplanner/menu.cgi  Tobacco Use:  Questioned patient about current smoking habits.  Pt. quit smoking some time ago.  ETOH screening Questions:  1-How often do you have a drink containing alcohol?                             1 times per week(s)  2-How many drinks containing alcohol do you have on a typical day when you are         Drinking?                              4 to 5   3- How often do you have 5 or more drinks on one occasion?                              1 time per week    Have you ever:  @None of the patient's responses to the CAGE screening were positive / Negative CAGE score@    Roomed by: Nay Batista CMA      "

## 2017-05-15 NOTE — PROGRESS NOTES
SUBJECTIVE:     CC: Yan Edwards is an 31 year old male who presents for preventative health visit.     Healthy Habits:    Do you get at least three servings of calcium containing foods daily (dairy, green leafy vegetables, etc.)? yes    Amount of exercise or daily activities, outside of work: 3 day(s) per week    Problems taking medications regularly No    Medication side effects: No    Have you had an eye exam in the past two years? no    Do you see a dentist twice per year? yes    Do you have sleep apnea, excessive snoring or daytime drowsiness?no            Today's PHQ-2 Score:   PHQ-2 ( 1999 Pfizer) 5/15/2017   Q1: Little interest or pleasure in doing things 0   Q2: Feeling down, depressed or hopeless 0   PHQ-2 Score 0       Abuse: Current or Past(Physical, Sexual or Emotional)- No  Do you feel safe in your environment - Yes    Social History   Substance Use Topics     Smoking status: Former Smoker     Packs/day: 0.50     Years: 2.00     Smokeless tobacco: Never Used      Comment: cut back to 3-4 /day     Alcohol use 3.0 - 4.8 oz/week     5 - 8 Standard drinks or equivalent per week     The patient does not drink >3 drinks per day nor >7 drinks per week.    Last PSA: No results found for: PSA    Recent Labs   Lab Test  07/08/15   0905  06/24/13   0902   CHOL  141  155   HDL  37*  46   LDL  82  87   TRIG  110  112   CHOLHDLRATIO  3.8  3.4       Reviewed orders with patient. Reviewed health maintenance and updated orders accordingly - Yes    Reviewed and updated as needed this visit by clinical staff  Tobacco  Allergies  Meds  Problems         Reviewed and updated as needed this visit by Provider        Past Medical History:   Diagnosis Date     NO ACTIVE PROBLEMS       Past Surgical History:   Procedure Laterality Date     HC REMOVAL OF TONSILS,<13 Y/O  age 3    Tonsils <10y.o.     HC TOOTH EXTRACTION W/FORCEP  age 14       ROS:  C: NEGATIVE for fever, chills, change in weight  I: NEGATIVE for worrisome  rashes, moles or lesions  E: NEGATIVE for vision changes or irritation  ENT: NEGATIVE for ear, mouth and throat problems  R: NEGATIVE for significant cough or SOB  CV: NEGATIVE for , palpitations or peripheral edema-has had atypical chest pains evaluated in ED and cards-felt to be inflammatory costochondiritis  GI: NEGATIVE for nausea, abdominal pain, heartburn, or change in bowel habits   male: negative for dysuria, hematuria, decreased urinary stream, erectile dysfunction, urethral discharge  M: NEGATIVE for significant arthralgias or myalgia  N: NEGATIVE for weakness, dizziness or paresthesias  P: NEGATIVE for changes in mood or affect    Problem list, Medication list, Allergies, and Medical/Social/Surgical histories reviewed in [facility] and updated as appropriate.  Labs reviewed in EPIC  BP Readings from Last 3 Encounters:   05/15/17 122/70   17 120/70   17 125/77    Wt Readings from Last 3 Encounters:   05/15/17 79 kg (174 lb 3.2 oz)   17 80.2 kg (176 lb 12.8 oz)   17 77.1 kg (170 lb)                  Patient Active Problem List   Diagnosis     Health Care Home     Abnormal blood chemistry     ACP (advance care planning)     FH: colon cancer     FH: melanoma     Past Surgical History:   Procedure Laterality Date     HC REMOVAL OF TONSILS,<11 Y/O  age 3    Tonsils <10y.o.     HC TOOTH EXTRACTION W/FORCEP  age 14       Social History   Substance Use Topics     Smoking status: Former Smoker     Packs/day: 0.50     Years: 2.00     Smokeless tobacco: Never Used      Comment: cut back to 3-4 /day     Alcohol use 3.0 - 4.8 oz/week     5 - 8 Standard drinks or equivalent per week     Family History   Problem Relation Age of Onset     Neurologic Disorder Mother      migraines     Arthritis Mother      osteoarthritis     Breast Cancer Mother      Neurologic Disorder Father      migraines     CEREBROVASCULAR DISEASE Maternal Grandmother 90          Hypertension Maternal Grandmother      Lipids  "Maternal Grandmother      Cancer - colorectal Maternal Grandmother      onset 85     Colon Cancer Maternal Grandmother      CANCER Maternal Grandfather 70     ocular melanoma     Cancer - colorectal Maternal Grandfather 80     CEREBROVASCULAR DISEASE Maternal Grandfather 86          Colon Cancer Maternal Grandfather      Melanoma Maternal Grandfather      DIABETES Paternal Grandmother 70     late onset     Alzheimer Disease Paternal Grandmother      demntia     CANCER Brother 33     nonmelanoma skin cancer     Neurologic Disorder Brother      Alyssa's         Current Outpatient Prescriptions   Medication Sig Dispense Refill     triamcinolone (KENALOG) 0.1 % cream   2     No Known Allergies  Recent Labs   Lab Test  17   1512  07/08/15   0905  13   0902   LDL   --   82  87   HDL   --   37*  46   TRIG   --   110  112   ALT   --   24   --    CR  0.85  0.90   --    GFRESTIMATED  >90  Non  GFR Calc    113   --    GFRESTBLACK  >90   GFR Calc     --    --    POTASSIUM  3.5  4.0   --    TSH  1.50   --    --       OBJECTIVE:     /70 (BP Location: Right arm, Patient Position: Chair, Cuff Size: Adult Large)  Pulse 58  Temp 98  F (36.7  C) (Oral)  Ht 1.778 m (5' 10\")  Wt 79 kg (174 lb 3.2 oz)  SpO2 98%  BMI 25 kg/m2  EXAM:  GENERAL: healthy, alert and no distress  EYES: Eyes grossly normal to inspection, PERRL and conjunctivae and sclerae normal  HENT: ear canals and TM's normal, nose and mouth without ulcers or lesions  NECK: no adenopathy, no asymmetry, masses, or scars and thyroid normal to palpation  RESP: lungs clear to auscultation - no rales, rhonchi or wheezes  CV: regular rate and rhythm, normal S1 S2, no S3 or S4, no murmur, click or rub, no peripheral edema and peripheral pulses strong  ABDOMEN: soft, nontender, no hepatosplenomegaly, no masses and bowel sounds normal   (male): normal male genitalia without lesions or urethral discharge, no hernia  MS: no " "gross musculoskeletal defects noted, no edema  SKIN: no suspicious lesions or rashes  NEURO: Normal strength and tone, mentation intact and speech normal  PSYCH: mentation appears normal, affect normal/bright  LYMPH: no cervical, supraclavicular, axillary, or inguinal adenopathy    ASSESSMENT/PLAN:     (Z00.00) Routine general medical examination at a health care facility  (primary encounter diagnosis)  Comment: discussed preventitive healthcare   Plan: Lipid Profile (QUEST), COMPREHENSIVE METABOLIC         PANEL (QUEST) XCMP, VENOUS COLLECTION        Continue to work on healthy diet and exercise, discussed healthy habits     (L30.9) Eczema, unspecified type  Comment: stable symptomatically   Plan:  discussed need to avoid extended exposure to hot water, apply moisturizing lotion regularly, and pat dry after bathing     (Z71.89) ACP (advance care planning)  Comment:   Plan:     (R07.89) Atypical chest pain  Comment: no new issues, comes and goes, not exertional-suspect constochondritis  Plan: f/u if not improving or worsening     patient given instructions to go to emergency department immediately if worsening of symptoms and verbalizes this understanding     (Z80.0) FH: colon cancer  Comment:   Plan:     (Z80.8) FH: melanoma  Comment:   Plan:     COUNSELING:  Reviewed preventive health counseling, as reflected in patient instructions       Regular exercise       Healthy diet/nutrition       Vision screening       Safe sex practices/STD prevention       Colon cancer screening       Prostate cancer screening         reports that he has quit smoking. He has a 1.00 pack-year smoking history. He has never used smokeless tobacco.    Estimated body mass index is 25 kg/(m^2) as calculated from the following:    Height as of this encounter: 1.778 m (5' 10\").    Weight as of this encounter: 79 kg (174 lb 3.2 oz).       Counseling Resources:  ATP IV Guidelines  Pooled Cohorts Equation Calculator  FRAX Risk Assessment  ICSI " Preventive Guidelines  Dietary Guidelines for Americans, 2010  USDA's MyPlate  ASA Prophylaxis  Lung CA Screening    Jose Angel Moreno MD  [facility]

## 2017-05-15 NOTE — MR AVS SNAPSHOT
"              After Visit Summary   5/15/2017    Yan Edwards    MRN: [id number]           Patient Information     Date Of Birth          1985        Visit Information        Provider Department      5/15/2017 11:30 AM Jose Angel Moreno MD [facility]        Today's Diagnoses     Routine general medical examination at a health care facility    -  1    Eczema, unspecified type        ACP (advance care planning)        Atypical chest pain        FH: colon cancer        FH: melanoma           Follow-ups after your visit        Follow-up notes from your care team     Return in about 1 year (around 5/15/2018).      Who to contact     If you have questions or need follow up information about today's clinic visit or your schedule please contact [facility] directly at [phone].  Normal or non-critical lab and imaging results will be communicated to you by MyChart, letter or phone within 4 business days after the clinic has received the results. If you do not hear from us within 7 days, please contact the clinic through MyChart or phone. If you have a critical or abnormal lab result, we will notify you by phone as soon as possible.  Submit refill requests through [employer] or call your pharmacy and they will forward the refill request to us. Please allow 3 business days for your refill to be completed.          Additional Information About Your Visit        MyChart Information     [employer] lets you send messages to your doctor, view your test results, renew your prescriptions, schedule appointments and more. To sign up, go to [email or web address] . Click on \"Log in\" on the left side of the screen, which will take you to the Welcome page. Then click on \"Sign up Now\" on the right side of the page.     You will be asked to enter the access code listed below, as well as some personal information. Please follow the directions to create your username and " "password.     Your access code is: [email or web address]  Expires: 2017  5:06 PM     Your access code will  in 90 days. If you need help or a new code, please call your [place] clinic or [phone].        Care EveryWhere ID     This is your Care EveryWhere ID. This could be used by other organizations to access your [place] medical records  [id number]        Your Vitals Were     Pulse Temperature Height Pulse Oximetry BMI (Body Mass Index)       58 98  F (36.7  C) (Oral) 1.778 m (5' 10\") 98% 25 kg/m2        Blood Pressure from Last 3 Encounters:   05/15/17 122/70   17 120/70   17 125/77    Weight from Last 3 Encounters:   05/15/17 79 kg (174 lb 3.2 oz)   17 80.2 kg (176 lb 12.8 oz)   17 77.1 kg (170 lb)              We Performed the Following     COMPREHENSIVE METABOLIC PANEL (QUEST) XCMP     Lipid Profile (QUEST)     VENOUS COLLECTION        Primary Care Provider Office Phone # Fax #    Jose Angel Moreno [phone]       [facility] [address] [address]        Thank you!     Thank you for choosing [facility]  for your care. Our goal is always to provide you with excellent care. Hearing back from our patients is one way we can continue to improve our services. Please take a few minutes to complete the written survey that you may receive in the mail after your visit with us. Thank you!             Your Updated Medication List - Protect others around you: Learn how to safely use, store and throw away your medicines at www.disposemymeds.org.          This list is accurate as of: 5/15/17 12:04 PM.  Always use your most recent med list.                   Brand Name Dispense Instructions for use    triamcinolone 0.1 % cream    KENALOG           "

## 2017-05-16 LAB
ALBUMIN SERPL-MCNC: 4.5 G/DL (ref 3.6–5.1)
ALBUMIN/GLOB SERPL: 1.7 (CALC) (ref 1–2.5)
ALP SERPL-CCNC: 59 U/L (ref 40–115)
ALT SERPL-CCNC: 17 U/L (ref 9–46)
AST SERPL-CCNC: 16 U/L (ref 10–40)
BILIRUB SERPL-MCNC: 0.3 MG/DL (ref 0.2–1.2)
BUN SERPL-MCNC: 17 MG/DL (ref 7–25)
BUN/CREATININE RATIO: NORMAL (CALC) (ref 6–22)
CALCIUM SERPL-MCNC: 9.1 MG/DL (ref 8.6–10.3)
CHLORIDE SERPLBLD-SCNC: 106 MMOL/L (ref 98–110)
CHOLEST SERPL-MCNC: 140 MG/DL (ref 125–200)
CHOLEST/HDLC SERPL: 3.1 (CALC)
CO2 SERPL-SCNC: 25 MMOL/L (ref 20–31)
CREAT SERPL-MCNC: 0.89 MG/DL (ref 0.6–1.35)
EGFR AFRICAN AMERICAN - QUEST: 130 ML/MIN/1.73M2
GFR SERPL CREATININE-BSD FRML MDRD: 112 ML/MIN/1.73M2
GLOBULIN, CALCULATED - QUEST: 2.6 G/DL (CALC) (ref 1.9–3.7)
GLUCOSE - QUEST: 89 MG/DL (ref 65–99)
HDLC SERPL-MCNC: 45 MG/DL
LDLC SERPL CALC-MCNC: 85 MG/DL (CALC)
NONHDLC SERPL-MCNC: 95 MG/DL (CALC)
POTASSIUM SERPL-SCNC: 3.9 MMOL/L (ref 3.5–5.3)
PROT SERPL-MCNC: 7.1 G/DL (ref 6.1–8.1)
SODIUM SERPL-SCNC: 140 MMOL/L (ref 135–146)
TRIGL SERPL-MCNC: 52 MG/DL

## 2018-05-02 ENCOUNTER — OFFICE VISIT (OUTPATIENT)
Dept: FAMILY MEDICINE | Facility: CLINIC | Age: 33
End: 2018-05-02

## 2018-05-02 VITALS
TEMPERATURE: 98.7 F | HEART RATE: 45 BPM | DIASTOLIC BLOOD PRESSURE: 64 MMHG | BODY MASS INDEX: 25.68 KG/M2 | OXYGEN SATURATION: 99 % | WEIGHT: 179 LBS | SYSTOLIC BLOOD PRESSURE: 110 MMHG

## 2018-05-02 DIAGNOSIS — T78.40XD ALLERGIC REACTION, SUBSEQUENT ENCOUNTER: Primary | ICD-10-CM

## 2018-05-02 PROCEDURE — 99213 OFFICE O/P EST LOW 20 MIN: CPT | Performed by: FAMILY MEDICINE

## 2018-05-02 NOTE — NURSING NOTE
Yan is here for a er follow up for a allergic reaction 3-31-18    Pre-Visit Screening :  Immunizations : up to date  Colon Screening : nahum  Asthma Action Test/Plan : nahum  PHQ9/GAD7 :  Na    Pulse - regular  My Chart - accepts    CLASSIFICATION OF OVERWEIGHT AND OBESITY BY BMI                         Obesity Class           BMI(kg/m2)  Underweight                                    < 18.5  Normal                                         18.5-24.9  Overweight                                     25.0-29.9  OBESITY                     I                  30.0-34.9                              II                 35.0-39.9  EXTREME OBESITY             III                >40                             Patient's  BMI Body mass index is 22.15 kg/(m^2).  http://hin.nhlbi.nih.gov/menuplanner/menu.cgi  Questioned patient about current smoking habits.  Pt. currently smokes.  Advised about smoking cessation.  The patient has verbalized that it is ok to leave a detailed voice message on the patient's cell phone with results/recommendations from this visit.       Verified [phone]  phone number:

## 2018-05-02 NOTE — PROGRESS NOTES
SUBJECTIVE:   Yan Edwards is a 32 year old male who presents to clinic today for the following health issues:      ED/UC Followup:    Facility:  [facility]  Date of visit: 3-31-18  Reason for visit: Pt had a really itchy rash, all over-treated for allergic rxn, possibly to monique or wine  Current Status: stable             Problem list and histories reviewed & adjusted, as indicated.  Additional history: as documented    Patient Active Problem List   Diagnosis     Health Care Home     Abnormal blood chemistry     ACP (advance care planning)     FH: colon cancer     FH: melanoma     Past Surgical History:   Procedure Laterality Date     HC REMOVAL OF TONSILS,<11 Y/O  age 3    Tonsils <10y.o.     HC TOOTH EXTRACTION W/FORCEP  age 14       Social History   Substance Use Topics     Smoking status: Former Smoker     Packs/day: 0.50     Years: 2.00     Smokeless tobacco: Never Used      Comment: cut back to 3-4 /day     Alcohol use 3.0 - 4.8 oz/week     5 - 8 Standard drinks or equivalent per week     Family History   Problem Relation Age of Onset     Neurologic Disorder Mother      migraines     Arthritis Mother      osteoarthritis     Breast Cancer Mother      Neurologic Disorder Father      migraines     CEREBROVASCULAR DISEASE Maternal Grandmother 90          Hypertension Maternal Grandmother      Lipids Maternal Grandmother      Cancer - colorectal Maternal Grandmother      onset 85     Colon Cancer Maternal Grandmother      CANCER Maternal Grandfather 70     ocular melanoma     Cancer - colorectal Maternal Grandfather 80     CEREBROVASCULAR DISEASE Maternal Grandfather 86          Colon Cancer Maternal Grandfather      Melanoma Maternal Grandfather      DIABETES Paternal Grandmother 70     late onset     Alzheimer Disease Paternal Grandmother      demntia     CANCER Brother 33     nonmelanoma skin cancer     Neurologic Disorder Brother      Tourette's         Current Outpatient Prescriptions    Medication Sig Dispense Refill     triamcinolone (KENALOG) 0.1 % cream   2     No Known Allergies  Recent Labs   Lab Test  05/15/17   1216  04/05/17   1512  07/08/15   0905   06/24/13   0902   LDL  85   --   82   --   87   HDL  45   --   37*   --   46   TRIG  52   --   110   --   112   ALT  17   --   24   --    --    CR  0.89  0.85  0.90   < >   --    GFRESTIMATED  112  >90  Non  GFR Calc    113   < >   --    GFRESTBLACK   --   >90   GFR Calc     --    --    --    POTASSIUM  3.9  3.5  4.0   < >   --    TSH   --   1.50   --    --    --     < > = values in this interval not displayed.      BP Readings from Last 3 Encounters:   05/02/18 110/64   05/15/17 122/70   04/25/17 120/70    Wt Readings from Last 3 Encounters:   05/02/18 81.2 kg (179 lb)   05/15/17 79 kg (174 lb 3.2 oz)   04/25/17 80.2 kg (176 lb 12.8 oz)                    Reviewed and updated as needed this visit by clinical staff       Reviewed and updated as needed this visit by Provider         ROS:  Constitutional, HEENT, cardiovascular, pulmonary, gi and gu systems are negative, except as otherwise noted.    OBJECTIVE:     /64 (BP Location: Left arm, Patient Position: Chair, Cuff Size: Adult Large)  Pulse (!) 45  Temp 98.7  F (37.1  C) (Oral)  Wt 81.2 kg (179 lb)  SpO2 99%  BMI 25.68 kg/m2  Body mass index is 25.68 kg/(m^2).   GENERAL: healthy, alert and no distress  NECK: no adenopathy, no asymmetry, masses, or scars and thyroid normal to palpation  RESP: lungs clear to auscultation - no rales, rhonchi or wheezes  CV: regular rate and rhythm, normal S1 S2, no S3 or S4, no murmur, click or rub, no peripheral edema and peripheral pulses strong  ABDOMEN: soft, nontender, no hepatosplenomegaly, no masses and bowel sounds normal  MS: no gross musculoskeletal defects noted, no edema    Diagnostic Test Results:  none     ASSESSMENT:       PLAN:   (T78.40XD) Allergic reaction, subsequent encounter  (primary encounter  diagnosis)  Comment: resolved urticarial reaction without anaphylaxis-fiarly severe skin reaction-recommend allergy eval  Plan: keep epi on hand=see allergy            Jose Angel Moreno MD  [facility]

## 2018-05-02 NOTE — MR AVS SNAPSHOT
After Visit Summary   5/2/2018    Yan Edwards    MRN: [id number]           Patient Information     Date Of Birth          1985        Visit Information        Provider Department      5/2/2018 3:30 PM Jose Angel Moreno MD [facility]        Today's Diagnoses     Allergic reaction, subsequent encounter    -  1       Follow-ups after your visit        Additional Services     ALLERGY/ASTHMA ADULT REFERRAL       Your provider has referred you to: [facility]: [facility] [phone]   [email or web address]    Please be aware that coverage of these services is subject to the terms and limitations of your health insurance plan.  Call member services at your health plan with any benefit or coverage questions.      Please bring the following with you to your appointment:    (1) Any X-Rays, CTs or MRIs which have been performed.  Contact the facility where they were done to arrange for  prior to your scheduled appointment.    (2) List of current medications  (3) This referral request   (4) Any documents/labs given to you for this referral                  Who to contact     If you have questions or need follow up information about today's clinic visit or your schedule please contact [facility] directly at [phone].  Normal or non-critical lab and imaging results will be communicated to you by MyChart, letter or phone within 4 business days after the clinic has received the results. If you do not hear from us within 7 days, please contact the clinic through MyChart or phone. If you have a critical or abnormal lab result, we will notify you by phone as soon as possible.  Submit refill requests through [employer] or call your pharmacy and they will forward the refill request to us. Please allow 3 business days for your refill to be completed.          Additional Information About Your Visit        MyChart  Information     [employer] gives you secure access to your electronic health record. If you see a primary care provider, you can also send messages to your care team and make appointments. If you have questions, please call your primary care clinic.  If you do not have a primary care provider, please call [phone] and they will assist you.        Care EveryWhere ID     This is your Care EveryWhere ID. This could be used by other organizations to access your [facility] medical records  [id number]        Your Vitals Were     Pulse Temperature Pulse Oximetry BMI (Body Mass Index)          45 98.7  F (37.1  C) (Oral) 99% 25.68 kg/m2         Blood Pressure from Last 3 Encounters:   05/02/18 110/64   05/15/17 122/70   04/25/17 120/70    Weight from Last 3 Encounters:   05/02/18 81.2 kg (179 lb)   05/15/17 79 kg (174 lb 3.2 oz)   04/25/17 80.2 kg (176 lb 12.8 oz)              We Performed the Following     ALLERGY/ASTHMA ADULT REFERRAL        Primary Care Provider Office Phone # Fax #    Jose Angel Moreno [phone]       [address] [facility] [address]  [facility] [address]        Equal Access to Services     RORY HIGUERA : Hadii aad ku hadasho Soomaali, waaxda luqadaha, qaybta kaalmada adeegyada, waxay idiin hayearlinen roxy hamilton . So [facility] [phone].    ATENCIÓN: Si habla español, tiene a soto disposición servicios gratuitos de asistencia lingüística. Llame al [phone].    We comply with applicable federal civil rights laws and [place] laws. We do not discriminate on the basis of race, color, national origin, age, disability, sex, sexual orientation, or gender identity.            Thank you!     Thank you for choosing [facility]  for your care. Our goal is always to provide you with excellent care. Hearing back from our patients is one way we can continue to improve our services. Please take a few minutes to complete the written survey that you may receive in the  mail after your visit with us. Thank you!             Your Updated Medication List - Protect others around you: Learn how to safely use, store and throw away your medicines at www.disposemymeds.org.          This list is accurate as of 5/2/18  3:57 PM.  Always use your most recent med list.                   Brand Name Dispense Instructions for use Diagnosis    triamcinolone 0.1 % cream    KENALOG

## 2020-03-02 ENCOUNTER — HEALTH MAINTENANCE LETTER (OUTPATIENT)
Age: 35
End: 2020-03-02

## 2020-09-21 ENCOUNTER — APPOINTMENT (OUTPATIENT)
Dept: URBAN - METROPOLITAN AREA CLINIC 253 | Age: 35
Setting detail: DERMATOLOGY
End: 2020-09-22

## 2020-09-21 DIAGNOSIS — Z80.8 FAMILY HISTORY OF MALIGNANT NEOPLASM OF OTHER ORGANS OR SYSTEMS: ICD-10-CM

## 2020-09-21 DIAGNOSIS — D22 MELANOCYTIC NEVI: ICD-10-CM

## 2020-09-21 DIAGNOSIS — Z71.89 OTHER SPECIFIED COUNSELING: ICD-10-CM

## 2020-09-21 DIAGNOSIS — L20.89 OTHER ATOPIC DERMATITIS: ICD-10-CM

## 2020-09-21 DIAGNOSIS — L72.0 EPIDERMAL CYST: ICD-10-CM

## 2020-09-21 DIAGNOSIS — L81.4 OTHER MELANIN HYPERPIGMENTATION: ICD-10-CM

## 2020-09-21 DIAGNOSIS — L82.1 OTHER SEBORRHEIC KERATOSIS: ICD-10-CM

## 2020-09-21 DIAGNOSIS — D18.0 HEMANGIOMA: ICD-10-CM

## 2020-09-21 PROBLEM — D22.9 MELANOCYTIC NEVI, UNSPECIFIED: Status: ACTIVE | Noted: 2020-09-21

## 2020-09-21 PROBLEM — L20.84 INTRINSIC (ALLERGIC) ECZEMA: Status: ACTIVE | Noted: 2020-09-21

## 2020-09-21 PROBLEM — D18.01 HEMANGIOMA OF SKIN AND SUBCUTANEOUS TISSUE: Status: ACTIVE | Noted: 2020-09-21

## 2020-09-21 PROBLEM — D23.71 OTHER BENIGN NEOPLASM OF SKIN OF RIGHT LOWER LIMB, INCLUDING HIP: Status: ACTIVE | Noted: 2020-09-21

## 2020-09-21 PROBLEM — D22.5 MELANOCYTIC NEVI OF TRUNK: Status: ACTIVE | Noted: 2020-09-21

## 2020-09-21 PROCEDURE — OTHER SUNSCREEN RECOMMENDATIONS: OTHER

## 2020-09-21 PROCEDURE — OTHER ADDITIONAL NOTES: OTHER

## 2020-09-21 PROCEDURE — 99203 OFFICE O/P NEW LOW 30 MIN: CPT

## 2020-09-21 PROCEDURE — OTHER COUNSELING: OTHER

## 2020-09-21 PROCEDURE — OTHER PRESCRIPTION: OTHER

## 2020-09-21 RX ORDER — TRIAMCINOLONE ACETONIDE 1 MG/G
CREAM TOPICAL BID
Qty: 1 | Refills: 2 | Status: ERX | COMMUNITY
Start: 2020-09-21

## 2020-09-21 ASSESSMENT — LOCATION DETAILED DESCRIPTION DERM
LOCATION DETAILED: INFERIOR THORACIC SPINE
LOCATION DETAILED: RIGHT SUPERIOR UPPER BACK

## 2020-09-21 ASSESSMENT — LOCATION ZONE DERM: LOCATION ZONE: TRUNK

## 2020-09-21 ASSESSMENT — LOCATION SIMPLE DESCRIPTION DERM
LOCATION SIMPLE: RIGHT UPPER BACK
LOCATION SIMPLE: UPPER BACK

## 2020-09-21 NOTE — HPI: FULL BODY SKIN EXAMINATION
How Severe Are Your Spot(S)?: mild
What Type Of Note Output Would You Prefer (Optional)?: Bullet Format
What Is The Reason For Today's Visit?: Full Body Skin Examination
What Is The Reason For Today's Visit? (Being Monitored For X): concerning skin lesions on an annual basis
Additional History: Fam hx of melanoma. No concerns today.

## 2020-09-21 NOTE — PROCEDURE: ADDITIONAL NOTES
Detail Level: Simple
Additional Notes: Discussed a surgical removal when patient decides.
Additional Notes: A female nurse was present for the exam. Care instructions were explained to the patient in detail. Told patient to call with any concerns or questions. The patient verbalized understanding and agreement of the education provided and the treatment plan. Encouraged patient to schedule a follow up appointment right after visit. At the end of the visit, all questions had been answered and the patient was satisfied with the visit.

## 2020-09-21 NOTE — PROCEDURE: SUNSCREEN RECOMMENDATIONS
General Sunscreen Counseling: I recommended a broad spectrum sunscreen with a SPF of 30 or higher.  I explained that SPF 30 sunscreen is recommended at minimum. Sun protective clothing can be used in lieu of sunscreen but must be worn the entire time you are exposed to the sun's rays.
Detail Level: Generalized

## 2020-12-20 ENCOUNTER — HEALTH MAINTENANCE LETTER (OUTPATIENT)
Age: 35
End: 2020-12-20

## 2021-04-18 ENCOUNTER — HEALTH MAINTENANCE LETTER (OUTPATIENT)
Age: 36
End: 2021-04-18

## 2021-10-03 ENCOUNTER — HEALTH MAINTENANCE LETTER (OUTPATIENT)
Age: 36
End: 2021-10-03

## 2022-05-15 ENCOUNTER — HEALTH MAINTENANCE LETTER (OUTPATIENT)
Age: 37
End: 2022-05-15

## 2022-09-04 ENCOUNTER — HEALTH MAINTENANCE LETTER (OUTPATIENT)
Age: 37
End: 2022-09-04